# Patient Record
Sex: MALE | Race: WHITE | URBAN - NONMETROPOLITAN AREA
[De-identification: names, ages, dates, MRNs, and addresses within clinical notes are randomized per-mention and may not be internally consistent; named-entity substitution may affect disease eponyms.]

---

## 2020-06-16 ENCOUNTER — OFFICE VISIT (OUTPATIENT)
Dept: URBAN - NONMETROPOLITAN AREA CLINIC 2 | Facility: CLINIC | Age: 20
End: 2020-06-16

## 2020-07-01 ENCOUNTER — TELEPHONE ENCOUNTER (OUTPATIENT)
Dept: URBAN - NONMETROPOLITAN AREA CLINIC 2 | Facility: CLINIC | Age: 20
End: 2020-07-01

## 2020-07-08 ENCOUNTER — TELEPHONE ENCOUNTER (OUTPATIENT)
Dept: URBAN - METROPOLITAN AREA CLINIC 92 | Facility: CLINIC | Age: 20
End: 2020-07-08

## 2020-07-13 ENCOUNTER — TELEPHONE ENCOUNTER (OUTPATIENT)
Dept: URBAN - METROPOLITAN AREA CLINIC 92 | Facility: CLINIC | Age: 20
End: 2020-07-13

## 2020-07-20 ENCOUNTER — OFFICE VISIT (OUTPATIENT)
Dept: URBAN - METROPOLITAN AREA TELEHEALTH 2 | Facility: TELEHEALTH | Age: 20
End: 2020-07-20

## 2020-07-20 DIAGNOSIS — K20.0 EOSINOPHILIC ESOPHAGITIS: ICD-10-CM

## 2020-07-20 DIAGNOSIS — K31.84 GASTROPARESIS: ICD-10-CM

## 2020-07-20 DIAGNOSIS — K50.80 CROHN'S DISEASE OF BOTH SMALL AND LARGE INTESTINE: ICD-10-CM

## 2020-07-20 DIAGNOSIS — R10.84 ABDOMINAL PAIN, GENERALIZED: ICD-10-CM

## 2020-07-20 PROCEDURE — 99213 OFFICE O/P EST LOW 20 MIN: CPT | Performed by: NURSE PRACTITIONER

## 2020-07-20 PROCEDURE — G8427 DOCREV CUR MEDS BY ELIG CLIN: HCPCS | Performed by: NURSE PRACTITIONER

## 2020-07-20 RX ORDER — PANTOPRAZOLE SODIUM 40 MG/1
TAKE 1 TABLET (40 MG) BY ORAL ROUTE 2 TIMES PER DAY FOR 90 DAYS TABLET, DELAYED RELEASE ORAL 2
Qty: 180 | Refills: 3 | Status: ACTIVE | COMMUNITY
Start: 2020-02-28 | End: 2021-02-22

## 2020-07-20 RX ORDER — INFLIXIMAB 100 MG/1
10MG/KG EVERY 8 WEEKS. 25MG IV BENADRYL AND 500MG TYLENOL PO BEFORE EACH INFUSION INJECTION, POWDER, LYOPHILIZED, FOR SOLUTION INTRAVENOUS
OUTPATIENT
Start: 2020-07-20

## 2020-07-20 RX ORDER — ONDANSETRON 4 MG/1
DISSOLVE  1 TABLET BY ORAL ROUTE QID PRN NAUSEA TABLET, ORALLY DISINTEGRATING ORAL
Qty: 64 | Refills: 2 | Status: ACTIVE | COMMUNITY
Start: 2020-02-28 | End: 1900-01-01

## 2020-07-20 RX ORDER — DICYCLOMINE HYDROCHLORIDE 10 MG/1
1 CAPSULES CAPSULE ORAL THREE TIMES A DAY
Qty: 270 CAPSULE | Refills: 3 | OUTPATIENT
Start: 2020-07-20 | End: 2021-07-15

## 2020-07-20 RX ORDER — INFLIXIMAB 100 MG/10ML
INJECTION, POWDER, LYOPHILIZED, FOR SOLUTION INTRAVENOUS
Qty: 0 | Refills: 0 | Status: ACTIVE | COMMUNITY
Start: 1900-01-01 | End: 1900-01-01

## 2020-07-20 RX ORDER — METOCLOPRAMIDE HYDROCHLORIDE 5 MG/1
TAKE 1 TABLET BY ORAL ROUTE 3 TIMES A DAY FOR 30 DAYS TABLET ORAL
Qty: 90 | Refills: 6 | Status: ACTIVE | COMMUNITY
Start: 2020-02-28 | End: 2020-09-25

## 2020-07-20 NOTE — HPI-TODAY'S VISIT:
Santiago returns for f/u of crohns, GP, and EoE via teleheatlh. He is here today to discuss medication options. He had been getting renflexis at [facility] and doing well. However, last week, his renflexis infusion was switched to  home infusion. He developed some rash and SOB about 30 minutes into the infusion and was sent to [facility] ED. He reports it has done the best to control his symptoms. He is having 1 nonbloody BM daily. No loose stools. He has some mild abdominal cramping. no additional complaints. SB

## 2020-07-22 ENCOUNTER — TELEPHONE ENCOUNTER (OUTPATIENT)
Dept: URBAN - METROPOLITAN AREA CLINIC 92 | Facility: CLINIC | Age: 20
End: 2020-07-22

## 2020-08-14 ENCOUNTER — TELEPHONE ENCOUNTER (OUTPATIENT)
Dept: URBAN - METROPOLITAN AREA CLINIC 92 | Facility: CLINIC | Age: 20
End: 2020-08-14

## 2020-08-14 RX ORDER — VEDOLIZUMAB 300 MG/5ML
AS DIRECTED INJECTION, POWDER, LYOPHILIZED, FOR SOLUTION INTRAVENOUS
Qty: 1 | Refills: 0 | OUTPATIENT
Start: 2020-08-14 | End: 2020-08-15

## 2020-08-21 ENCOUNTER — LAB OUTSIDE AN ENCOUNTER (OUTPATIENT)
Dept: URBAN - METROPOLITAN AREA CLINIC 92 | Facility: CLINIC | Age: 20
End: 2020-08-21

## 2020-08-26 ENCOUNTER — OFFICE VISIT (OUTPATIENT)
Dept: URBAN - NONMETROPOLITAN AREA CLINIC 1 | Facility: CLINIC | Age: 20
End: 2020-08-26

## 2020-08-26 DIAGNOSIS — K50.80 CROHN'S COLITIS: ICD-10-CM

## 2020-08-26 PROCEDURE — 96413 CHEMO IV INFUSION 1 HR: CPT | Performed by: INTERNAL MEDICINE

## 2020-08-26 RX ORDER — METOCLOPRAMIDE HYDROCHLORIDE 5 MG/1
TAKE 1 TABLET BY ORAL ROUTE 3 TIMES A DAY FOR 30 DAYS TABLET ORAL
Qty: 90 | Refills: 6 | Status: ACTIVE | COMMUNITY
Start: 2020-02-28 | End: 2020-09-25

## 2020-08-26 RX ORDER — PANTOPRAZOLE SODIUM 40 MG/1
TAKE 1 TABLET (40 MG) BY ORAL ROUTE 2 TIMES PER DAY FOR 90 DAYS TABLET, DELAYED RELEASE ORAL 2
Qty: 180 | Refills: 3 | Status: ACTIVE | COMMUNITY
Start: 2020-02-28 | End: 2021-02-22

## 2020-08-26 RX ORDER — INFLIXIMAB 100 MG/10ML
INJECTION, POWDER, LYOPHILIZED, FOR SOLUTION INTRAVENOUS
Qty: 0 | Refills: 0 | Status: ACTIVE | COMMUNITY
Start: 1900-01-01 | End: 1900-01-01

## 2020-08-26 RX ORDER — ONDANSETRON 4 MG/1
DISSOLVE  1 TABLET BY ORAL ROUTE QID PRN NAUSEA TABLET, ORALLY DISINTEGRATING ORAL
Qty: 64 | Refills: 2 | Status: ACTIVE | COMMUNITY
Start: 2020-02-28 | End: 1900-01-01

## 2020-08-26 RX ORDER — DICYCLOMINE HYDROCHLORIDE 10 MG/1
1 CAPSULES CAPSULE ORAL THREE TIMES A DAY
Qty: 270 CAPSULE | Refills: 3 | Status: ACTIVE | COMMUNITY
Start: 2020-07-20 | End: 2021-07-15

## 2020-08-26 RX ORDER — INFLIXIMAB 100 MG/1
10MG/KG EVERY 8 WEEKS. 25MG IV BENADRYL AND 500MG TYLENOL PO BEFORE EACH INFUSION INJECTION, POWDER, LYOPHILIZED, FOR SOLUTION INTRAVENOUS
Status: ACTIVE | COMMUNITY
Start: 2020-07-20

## 2020-09-09 ENCOUNTER — OFFICE VISIT (OUTPATIENT)
Dept: URBAN - NONMETROPOLITAN AREA CLINIC 1 | Facility: CLINIC | Age: 20
End: 2020-09-09

## 2020-09-09 DIAGNOSIS — K50.80 CROHN'S COLITIS: ICD-10-CM

## 2020-09-09 PROCEDURE — 96413 CHEMO IV INFUSION 1 HR: CPT | Performed by: INTERNAL MEDICINE

## 2020-09-09 RX ORDER — DICYCLOMINE HYDROCHLORIDE 10 MG/1
1 CAPSULES CAPSULE ORAL THREE TIMES A DAY
Qty: 270 CAPSULE | Refills: 3 | Status: ACTIVE | COMMUNITY
Start: 2020-07-20 | End: 2021-07-15

## 2020-09-09 RX ORDER — METOCLOPRAMIDE HYDROCHLORIDE 5 MG/1
TAKE 1 TABLET BY ORAL ROUTE 3 TIMES A DAY FOR 30 DAYS TABLET ORAL
Qty: 90 | Refills: 6 | Status: ACTIVE | COMMUNITY
Start: 2020-02-28 | End: 2020-09-25

## 2020-09-09 RX ORDER — ONDANSETRON 4 MG/1
DISSOLVE  1 TABLET BY ORAL ROUTE QID PRN NAUSEA TABLET, ORALLY DISINTEGRATING ORAL
Qty: 64 | Refills: 2 | Status: ACTIVE | COMMUNITY
Start: 2020-02-28 | End: 1900-01-01

## 2020-09-09 RX ORDER — INFLIXIMAB 100 MG/1
10MG/KG EVERY 8 WEEKS. 25MG IV BENADRYL AND 500MG TYLENOL PO BEFORE EACH INFUSION INJECTION, POWDER, LYOPHILIZED, FOR SOLUTION INTRAVENOUS
Status: ACTIVE | COMMUNITY
Start: 2020-07-20

## 2020-09-09 RX ORDER — PANTOPRAZOLE SODIUM 40 MG/1
TAKE 1 TABLET (40 MG) BY ORAL ROUTE 2 TIMES PER DAY FOR 90 DAYS TABLET, DELAYED RELEASE ORAL 2
Qty: 180 | Refills: 3 | Status: ACTIVE | COMMUNITY
Start: 2020-02-28 | End: 2021-02-22

## 2020-09-09 RX ORDER — INFLIXIMAB 100 MG/10ML
INJECTION, POWDER, LYOPHILIZED, FOR SOLUTION INTRAVENOUS
Qty: 0 | Refills: 0 | Status: ACTIVE | COMMUNITY
Start: 1900-01-01 | End: 1900-01-01

## 2020-10-07 ENCOUNTER — OFFICE VISIT (OUTPATIENT)
Dept: URBAN - NONMETROPOLITAN AREA CLINIC 1 | Facility: CLINIC | Age: 20
End: 2020-10-07

## 2020-11-04 ENCOUNTER — OFFICE VISIT (OUTPATIENT)
Dept: URBAN - NONMETROPOLITAN AREA CLINIC 1 | Facility: CLINIC | Age: 20
End: 2020-11-04

## 2020-11-04 DIAGNOSIS — K50.80 CROHN'S COLITIS: ICD-10-CM

## 2020-11-04 PROCEDURE — 96413 CHEMO IV INFUSION 1 HR: CPT | Performed by: INTERNAL MEDICINE

## 2020-11-04 RX ORDER — ONDANSETRON 4 MG/1
DISSOLVE  1 TABLET BY ORAL ROUTE QID PRN NAUSEA TABLET, ORALLY DISINTEGRATING ORAL
Qty: 64 | Refills: 2 | Status: ACTIVE | COMMUNITY
Start: 2020-02-28 | End: 1900-01-01

## 2020-11-04 RX ORDER — INFLIXIMAB 100 MG/1
10MG/KG EVERY 8 WEEKS. 25MG IV BENADRYL AND 500MG TYLENOL PO BEFORE EACH INFUSION INJECTION, POWDER, LYOPHILIZED, FOR SOLUTION INTRAVENOUS
Status: ACTIVE | COMMUNITY
Start: 2020-07-20

## 2020-11-04 RX ORDER — DICYCLOMINE HYDROCHLORIDE 10 MG/1
1 CAPSULES CAPSULE ORAL THREE TIMES A DAY
Qty: 270 CAPSULE | Refills: 3 | Status: ACTIVE | COMMUNITY
Start: 2020-07-20 | End: 2021-07-15

## 2020-11-04 RX ORDER — PANTOPRAZOLE SODIUM 40 MG/1
TAKE 1 TABLET (40 MG) BY ORAL ROUTE 2 TIMES PER DAY FOR 90 DAYS TABLET, DELAYED RELEASE ORAL 2
Qty: 180 | Refills: 3 | Status: ACTIVE | COMMUNITY
Start: 2020-02-28 | End: 2021-02-22

## 2020-11-04 RX ORDER — INFLIXIMAB 100 MG/10ML
INJECTION, POWDER, LYOPHILIZED, FOR SOLUTION INTRAVENOUS
Qty: 0 | Refills: 0 | Status: ACTIVE | COMMUNITY
Start: 1900-01-01 | End: 1900-01-01

## 2020-11-05 ENCOUNTER — TELEPHONE ENCOUNTER (OUTPATIENT)
Dept: URBAN - METROPOLITAN AREA CLINIC 92 | Facility: CLINIC | Age: 20
End: 2020-11-05

## 2020-11-10 ENCOUNTER — TELEPHONE ENCOUNTER (OUTPATIENT)
Dept: URBAN - METROPOLITAN AREA CLINIC 92 | Facility: CLINIC | Age: 20
End: 2020-11-10

## 2020-11-20 LAB
A/G RATIO: 2.2
ALBUMIN: 4.7
ALKALINE PHOSPHATASE: 48
ALT (SGPT): 13
ANTI-VEDOLIZUMAB ANTIBODY: <25
AST (SGOT): 12
BASO (ABSOLUTE): 0.1
BASOS: 1
BILIRUBIN, TOTAL: 0.8
BUN/CREATININE RATIO: 13
BUN: 15
C-REACTIVE PROTEIN, QUANT: <1
CALCIUM: 9.9
CARBON DIOXIDE, TOTAL: 26
CHLORIDE: 102
CREATININE: 1.16
EGFR IF AFRICN AM: 104
EGFR IF NONAFRICN AM: 90
EOS (ABSOLUTE): 0.2
EOS: 2
GLOBULIN, TOTAL: 2.1
GLUCOSE: 96
HEMATOCRIT: 42.8
HEMATOLOGY COMMENTS:: (no result)
HEMOGLOBIN: 15
IMMATURE CELLS: (no result)
IMMATURE GRANS (ABS): 0
IMMATURE GRANULOCYTES: 0
INR: 1
LYMPHS (ABSOLUTE): 2.7
LYMPHS: 36
MCH: 31.6
MCHC: 35
MCV: 90
MONOCYTES(ABSOLUTE): 0.8
MONOCYTES: 10
NEUTROPHILS (ABSOLUTE): 4
NEUTROPHILS: 51
NRBC: (no result)
PLATELETS: 252
POTASSIUM: 4.1
PROTEIN, TOTAL: 6.8
PROTHROMBIN TIME: 10.9
RBC: 4.75
RDW: 11.5
SARS-COV-2 ANTIBODIES: NEGATIVE
SEDIMENTATION RATE-WESTERGREN: 2
SODIUM: 141
VEDOLIZUMAB: 69
WBC: 7.7

## 2020-12-18 ENCOUNTER — OFFICE VISIT (OUTPATIENT)
Dept: URBAN - METROPOLITAN AREA TELEHEALTH 2 | Facility: TELEHEALTH | Age: 20
End: 2020-12-18

## 2020-12-18 DIAGNOSIS — Z09 FOLLOW UP: ICD-10-CM

## 2020-12-18 DIAGNOSIS — D89.9 IMMUNOSUPPRESSED STATUS: ICD-10-CM

## 2020-12-18 DIAGNOSIS — K20.0 EOSINOPHILIC ESOPHAGITIS: ICD-10-CM

## 2020-12-18 DIAGNOSIS — K51.90 ULCERATIVE COLITIS: ICD-10-CM

## 2020-12-18 DIAGNOSIS — K31.84 GASTROPARESIS: ICD-10-CM

## 2020-12-18 DIAGNOSIS — Z12.11 COLON CANCER SCREENING: ICD-10-CM

## 2020-12-18 DIAGNOSIS — K50.90 CROHNS DISEASE: ICD-10-CM

## 2020-12-18 PROCEDURE — 1036F TOBACCO NON-USER: CPT | Performed by: INTERNAL MEDICINE

## 2020-12-18 PROCEDURE — G9622 NO UNHEAL ETOH USER: HCPCS | Performed by: INTERNAL MEDICINE

## 2020-12-18 PROCEDURE — 3017F COLORECTAL CA SCREEN DOC REV: CPT | Performed by: INTERNAL MEDICINE

## 2020-12-18 PROCEDURE — G8417 CALC BMI ABV UP PARAM F/U: HCPCS | Performed by: INTERNAL MEDICINE

## 2020-12-18 PROCEDURE — G8418 CALC BMI BLW LOW PARAM F/U: HCPCS | Performed by: INTERNAL MEDICINE

## 2020-12-18 PROCEDURE — G8427 DOCREV CUR MEDS BY ELIG CLIN: HCPCS | Performed by: INTERNAL MEDICINE

## 2020-12-18 PROCEDURE — G8420 CALC BMI NORM PARAMETERS: HCPCS | Performed by: INTERNAL MEDICINE

## 2020-12-18 PROCEDURE — 996 AG2 (NON BILLABLE): Performed by: INTERNAL MEDICINE

## 2020-12-18 PROCEDURE — G9902 PT SCRN TBCO AND ID AS USER: HCPCS | Performed by: INTERNAL MEDICINE

## 2020-12-18 PROCEDURE — G8482 FLU IMMUNIZE ORDER/ADMIN: HCPCS | Performed by: INTERNAL MEDICINE

## 2020-12-18 PROCEDURE — G9903 PT SCRN TBCO ID AS NON USER: HCPCS | Performed by: INTERNAL MEDICINE

## 2020-12-18 RX ORDER — INFLIXIMAB 100 MG/10ML
INJECTION, POWDER, LYOPHILIZED, FOR SOLUTION INTRAVENOUS
Qty: 0 | Refills: 0 | Status: ACTIVE | COMMUNITY
Start: 1900-01-01 | End: 1900-01-01

## 2020-12-18 RX ORDER — PANTOPRAZOLE SODIUM 40 MG/1
TAKE 1 TABLET (40 MG) BY ORAL ROUTE 2 TIMES PER DAY FOR 90 DAYS TABLET, DELAYED RELEASE ORAL 2
Qty: 180 | Refills: 3 | Status: ACTIVE | COMMUNITY
Start: 2020-02-28 | End: 2021-02-22

## 2020-12-18 RX ORDER — INFLIXIMAB 100 MG/1
10MG/KG EVERY 8 WEEKS. 25MG IV BENADRYL AND 500MG TYLENOL PO BEFORE EACH INFUSION INJECTION, POWDER, LYOPHILIZED, FOR SOLUTION INTRAVENOUS
Status: ACTIVE | COMMUNITY
Start: 2020-07-20

## 2020-12-18 RX ORDER — ONDANSETRON 4 MG/1
DISSOLVE  1 TABLET BY ORAL ROUTE QID PRN NAUSEA TABLET, ORALLY DISINTEGRATING ORAL
Qty: 64 | Refills: 2 | Status: ACTIVE | COMMUNITY
Start: 2020-02-28 | End: 1900-01-01

## 2020-12-18 RX ORDER — DICYCLOMINE HYDROCHLORIDE 10 MG/1
1 CAPSULES CAPSULE ORAL THREE TIMES A DAY
Qty: 270 CAPSULE | Refills: 3 | Status: ACTIVE | COMMUNITY
Start: 2020-07-20 | End: 2021-07-15

## 2020-12-30 ENCOUNTER — OFFICE VISIT (OUTPATIENT)
Dept: URBAN - NONMETROPOLITAN AREA CLINIC 1 | Facility: CLINIC | Age: 20
End: 2020-12-30

## 2021-01-20 ENCOUNTER — TELEPHONE ENCOUNTER (OUTPATIENT)
Dept: URBAN - METROPOLITAN AREA CLINIC 92 | Facility: CLINIC | Age: 21
End: 2021-01-20

## 2021-01-20 ENCOUNTER — LAB OUTSIDE AN ENCOUNTER (OUTPATIENT)
Dept: URBAN - METROPOLITAN AREA TELEHEALTH 2 | Facility: TELEHEALTH | Age: 21
End: 2021-01-20

## 2021-01-20 ENCOUNTER — OFFICE VISIT (OUTPATIENT)
Dept: URBAN - METROPOLITAN AREA TELEHEALTH 2 | Facility: TELEHEALTH | Age: 21
End: 2021-01-20

## 2021-01-20 DIAGNOSIS — K31.84 GASTROPARESIS: ICD-10-CM

## 2021-01-20 DIAGNOSIS — K20.0 EOSINOPHILIC ESOPHAGITIS: ICD-10-CM

## 2021-01-20 DIAGNOSIS — K59.00 CONSTIPATED: ICD-10-CM

## 2021-01-20 DIAGNOSIS — K59.09 CHRONIC CONSTIPATION: ICD-10-CM

## 2021-01-20 DIAGNOSIS — K51.80 CHRONIC PANCOLONIC ULCERATIVE COLITIS: ICD-10-CM

## 2021-01-20 DIAGNOSIS — Z91.89 COLON CANCER HIGH RISK: ICD-10-CM

## 2021-01-20 DIAGNOSIS — Z09 FOLLOW UP: ICD-10-CM

## 2021-01-20 DIAGNOSIS — R53.83 FATIGUE: ICD-10-CM

## 2021-01-20 DIAGNOSIS — K51.90 ULCERATIVE COLITIS: ICD-10-CM

## 2021-01-20 DIAGNOSIS — D89.9 IMMUNOSUPPRESSED STATUS: ICD-10-CM

## 2021-01-20 PROCEDURE — 3017F COLORECTAL CA SCREEN DOC REV: CPT | Performed by: INTERNAL MEDICINE

## 2021-01-20 PROCEDURE — G8482 FLU IMMUNIZE ORDER/ADMIN: HCPCS | Performed by: INTERNAL MEDICINE

## 2021-01-20 PROCEDURE — G9622 NO UNHEAL ETOH USER: HCPCS | Performed by: INTERNAL MEDICINE

## 2021-01-20 PROCEDURE — 1036F TOBACCO NON-USER: CPT | Performed by: INTERNAL MEDICINE

## 2021-01-20 PROCEDURE — 99215 OFFICE O/P EST HI 40 MIN: CPT | Performed by: INTERNAL MEDICINE

## 2021-01-20 PROCEDURE — G8420 CALC BMI NORM PARAMETERS: HCPCS | Performed by: INTERNAL MEDICINE

## 2021-01-20 PROCEDURE — G9903 PT SCRN TBCO ID AS NON USER: HCPCS | Performed by: INTERNAL MEDICINE

## 2021-01-20 PROCEDURE — G8427 DOCREV CUR MEDS BY ELIG CLIN: HCPCS | Performed by: INTERNAL MEDICINE

## 2021-01-20 RX ORDER — ADALIMUMAB 40MG/0.4ML
AS DIRECTED KIT SUBCUTANEOUS
Qty: 1 | Refills: 11 | OUTPATIENT
Start: 2021-01-20 | End: 2021-02-01

## 2021-01-20 RX ORDER — INFLIXIMAB 100 MG/1
10MG/KG EVERY 8 WEEKS. 25MG IV BENADRYL AND 500MG TYLENOL PO BEFORE EACH INFUSION INJECTION, POWDER, LYOPHILIZED, FOR SOLUTION INTRAVENOUS
Status: ACTIVE | COMMUNITY
Start: 2020-07-20

## 2021-01-20 RX ORDER — SODIUM PICOSULFATE, MAGNESIUM OXIDE, AND ANHYDROUS CITRIC ACID 10; 3.5; 12 MG/160ML; G/160ML; G/160ML
320ML LIQUID ORAL ONCE
Qty: 1 KIT | Refills: 1 | OUTPATIENT
Start: 2021-01-20 | End: 2021-01-22

## 2021-01-20 RX ORDER — ADALIMUMAB 80MG/0.8ML
160MG WEEK 0 AND THEN 80 MG WEEK 2 KIT SUBCUTANEOUS EVERY 2 WEEKS
Qty: 3 PENS | Refills: 0 | OUTPATIENT
Start: 2021-01-20

## 2021-01-20 RX ORDER — ONDANSETRON 4 MG/1
DISSOLVE  1 TABLET BY ORAL ROUTE QID PRN NAUSEA TABLET, ORALLY DISINTEGRATING ORAL
Qty: 64 | Refills: 2 | Status: ACTIVE | COMMUNITY
Start: 2020-02-28 | End: 1900-01-01

## 2021-01-20 RX ORDER — INFLIXIMAB 100 MG/10ML
INJECTION, POWDER, LYOPHILIZED, FOR SOLUTION INTRAVENOUS
Qty: 0 | Refills: 0 | Status: ACTIVE | COMMUNITY
Start: 1900-01-01 | End: 1900-01-01

## 2021-01-20 RX ORDER — PANTOPRAZOLE SODIUM 40 MG/1
TAKE 1 TABLET (40 MG) BY ORAL ROUTE 2 TIMES PER DAY FOR 90 DAYS TABLET, DELAYED RELEASE ORAL 2
Qty: 180 | Refills: 3 | Status: ACTIVE | COMMUNITY
Start: 2020-02-28 | End: 2021-02-22

## 2021-01-20 RX ORDER — DICYCLOMINE HYDROCHLORIDE 10 MG/1
1 CAPSULES CAPSULE ORAL THREE TIMES A DAY
Qty: 270 CAPSULE | Refills: 3 | Status: ACTIVE | COMMUNITY
Start: 2020-07-20 | End: 2021-07-15

## 2021-02-11 ENCOUNTER — OFFICE VISIT (OUTPATIENT)
Dept: URBAN - METROPOLITAN AREA SURGERY CENTER 18 | Facility: SURGERY CENTER | Age: 21
End: 2021-02-11

## 2021-02-11 DIAGNOSIS — K51.00 CHRONIC PANCOLONIC ULCERATIVE COLITIS: ICD-10-CM

## 2021-02-11 PROCEDURE — G8907 PT DOC NO EVENTS ON DISCHARG: HCPCS | Performed by: INTERNAL MEDICINE

## 2021-02-11 PROCEDURE — 45380 COLONOSCOPY AND BIOPSY: CPT | Performed by: INTERNAL MEDICINE

## 2021-02-11 RX ORDER — ONDANSETRON 4 MG/1
DISSOLVE  1 TABLET BY ORAL ROUTE QID PRN NAUSEA TABLET, ORALLY DISINTEGRATING ORAL
Qty: 64 | Refills: 2 | Status: ACTIVE | COMMUNITY
Start: 2020-02-28 | End: 1900-01-01

## 2021-02-11 RX ORDER — DICYCLOMINE HYDROCHLORIDE 10 MG/1
1 CAPSULES CAPSULE ORAL THREE TIMES A DAY
Qty: 270 CAPSULE | Refills: 3 | Status: ACTIVE | COMMUNITY
Start: 2020-07-20 | End: 2021-07-15

## 2021-02-11 RX ORDER — INFLIXIMAB 100 MG/10ML
INJECTION, POWDER, LYOPHILIZED, FOR SOLUTION INTRAVENOUS
Qty: 0 | Refills: 0 | Status: ACTIVE | COMMUNITY
Start: 1900-01-01 | End: 1900-01-01

## 2021-02-11 RX ORDER — ADALIMUMAB 80MG/0.8ML
160MG WEEK 0 AND THEN 80 MG WEEK 2 KIT SUBCUTANEOUS EVERY 2 WEEKS
Qty: 3 PENS | Refills: 0 | Status: ACTIVE | COMMUNITY
Start: 2021-01-20

## 2021-02-11 RX ORDER — INFLIXIMAB 100 MG/1
10MG/KG EVERY 8 WEEKS. 25MG IV BENADRYL AND 500MG TYLENOL PO BEFORE EACH INFUSION INJECTION, POWDER, LYOPHILIZED, FOR SOLUTION INTRAVENOUS
Status: ACTIVE | COMMUNITY
Start: 2020-07-20

## 2021-02-11 RX ORDER — PANTOPRAZOLE SODIUM 40 MG/1
TAKE 1 TABLET (40 MG) BY ORAL ROUTE 2 TIMES PER DAY FOR 90 DAYS TABLET, DELAYED RELEASE ORAL 2
Qty: 180 | Refills: 3 | Status: ACTIVE | COMMUNITY
Start: 2020-02-28 | End: 2021-02-22

## 2021-02-26 ENCOUNTER — OFFICE VISIT (OUTPATIENT)
Dept: URBAN - METROPOLITAN AREA TELEHEALTH 2 | Facility: TELEHEALTH | Age: 21
End: 2021-02-26

## 2021-02-26 DIAGNOSIS — K51.80 CHRONIC PANCOLONIC ULCERATIVE COLITIS: ICD-10-CM

## 2021-02-26 DIAGNOSIS — K31.84 GASTROPARESIS: ICD-10-CM

## 2021-02-26 DIAGNOSIS — D89.9 IMMUNOSUPPRESSED STATUS: ICD-10-CM

## 2021-02-26 DIAGNOSIS — K20.0 EOSINOPHILIC ESOPHAGITIS: ICD-10-CM

## 2021-02-26 PROBLEM — 14760008 CONSTIPATED: Status: ACTIVE | Noted: 2021-01-20

## 2021-02-26 PROBLEM — 235675006 GASTROPARESIS: Status: ACTIVE | Noted: 2020-07-20

## 2021-02-26 PROCEDURE — 99215 OFFICE O/P EST HI 40 MIN: CPT | Performed by: INTERNAL MEDICINE

## 2021-02-26 RX ORDER — ADALIMUMAB 40MG/0.4ML
0.4 ML KIT SUBCUTANEOUS
Qty: 1 | Refills: 11 | OUTPATIENT
Start: 2021-02-26 | End: 2022-01-28

## 2021-02-26 RX ORDER — INFLIXIMAB 100 MG/10ML
INJECTION, POWDER, LYOPHILIZED, FOR SOLUTION INTRAVENOUS
Qty: 0 | Refills: 0 | Status: DISCONTINUED | COMMUNITY
Start: 1900-01-01

## 2021-02-26 RX ORDER — ADALIMUMAB 80MG/0.8ML
160MG WEEK 0 AND THEN 80 MG WEEK 2 KIT SUBCUTANEOUS EVERY 2 WEEKS
Qty: 3 PENS | Refills: 0 | Status: ACTIVE | COMMUNITY
Start: 2021-01-20

## 2021-02-26 RX ORDER — ADALIMUMAB 80MG/0.8ML
160MG WEEK 0 AND THEN 80 MG WEEK 2 KIT SUBCUTANEOUS EVERY 2 WEEKS
Qty: 3 PENS | Refills: 0 | OUTPATIENT

## 2021-02-26 RX ORDER — DICYCLOMINE HYDROCHLORIDE 10 MG/1
1 CAPSULES CAPSULE ORAL THREE TIMES A DAY
Qty: 270 CAPSULE | Refills: 3 | Status: ACTIVE | COMMUNITY
Start: 2020-07-20 | End: 2021-07-15

## 2021-02-26 RX ORDER — ONDANSETRON 4 MG/1
DISSOLVE  1 TABLET BY ORAL ROUTE QID PRN NAUSEA TABLET, ORALLY DISINTEGRATING ORAL
Qty: 64 | Refills: 2 | Status: ACTIVE | COMMUNITY
Start: 2020-02-28

## 2021-02-26 RX ORDER — INFLIXIMAB 100 MG/1
10MG/KG EVERY 8 WEEKS. 25MG IV BENADRYL AND 500MG TYLENOL PO BEFORE EACH INFUSION INJECTION, POWDER, LYOPHILIZED, FOR SOLUTION INTRAVENOUS
Status: DISCONTINUED | COMMUNITY
Start: 2020-07-20

## 2021-02-26 RX ORDER — SULFASALAZINE 500 MG/1
6 TABLET TABLET ORAL ONCE A DAY
Qty: 180 TABLET | Refills: 11 | OUTPATIENT
Start: 2021-02-26 | End: 2022-02-21

## 2021-02-26 NOTE — HPI-OTHER HISTORIES
Pt Tawnya is a 21 y/o indiv with colonic UC, currently on entyvio (stopped 10/2020), here for evaluation of refractory dz. . Pt is referred by Dr. Sabrina Hurley. . Pt was dxd with UC initially, was started on Lialda, which did not work.  Eventually, he was diagnosed with CD based on location, and was started on Humira.  Did not work well for him.  He then took Remicade, to which he had a reaction.  He then started entyvio.  Stopped it after experiencing some SOB and chest tightness.  His last dose was about 10/2020.   . Previously on 1/19/2021, pt reports that he currently he is constipation.  No blood in the stool.  Occasional abdominal pain, very mild, nothing makes it better or worse. . Today on 2/26/2021, pt reports that he has occasional bouts of abdominal pain.  However, he has bad arthritis. . PMH: arthritis (back, ankle, knee); UC; gastroparesis, EOE, meduallary kidney disease . 2/2021: The terminal ileum appeared normal. Biopsies were taken with a cold forceps for histology. The pathology specimen was placed into Bottle Number 1. An area of mildly erythematous mucosa was found in the ascending colon and in the cecum. This was biopsied with a cold forceps for histology. The pathology specimen was placed into Bottle Number 2. An area of erythematous mucosa was found in the transverse colon. This was biopsied with a cold forceps for histology. The pathology specimen was placed into Bottle Number 3. An area of erythematous mucosa was found in the descending colon. This was biopsied with a cold forceps for histology. The pathology specimen was placed into Bottle Number 4. An area of erythematous mucosa was found in the rectum, in the recto-sigmoid colon and in the sigmoid colon. This was biopsied with a cold forceps for histology. The pathology specimen was placed into Bottle Number 5. A Terminal ileum, biopsy Small bowel mucosa with no significant histopathology. No histologic evidence of active ileitis, granulomata or dysplasia. B Ascending colon, biopsy Minimal to mild active colitis, see comment. No histologic evidence of granulomata or dysplasia. C Transverse colon, biopsy Minimal active colitis, see comment. No histologic evidence of granulomata or dysplasia. D Descending colon, biopsy Minimal to mild active colitis, see comment. No histologic evidence of granulomata or dysplasia. E Rectosigmoid colon, biopsy Colonic mucosa with no significant histopathology. No histologic evidence of active colitis, granulomata or dysplasia. . This patient's clinical history of ulcerative colitis is noted. There is no evidence of dysplasia or granulomata identified. Clinical, endoscopic and pathologic correlation is required. . 1/2020: A. Esophagus , Biopsy: -Eosinophilic esophagitis. -Up to 20 eosinophils per high magnification field present in the squamous epithelium. -Alcian blue/PAS stain highlights a thickened squamous basal layer. -No fungal organisms or viral inclusions. -Negative for dysplasia and malignancy. . 2019: A.	Second	part	of	Duodenum	,	Biopsy: -Enteric	mucosa	with	no	diagnostic	abnormality. -Normal	villous	architecture;	no	evidence	of	Celiac	sprue. -Alcian	blue/PAS	stain	to	identify	Whipple	disease	is	negative;	no	parasites	are	seen. -Negative	for	dysplasia	and	malignancy. B.	Esophagus	,	Biopsy: -Eosinophilic	esophagitis. -15-20	eosinophils	per	high	magnification	field	focally	present	in	the	squamous	epithelium. -Alcian	blue/PAS	stain	highlights	a	thickened	squamous	basal	layer. -No	fungal	organisms	or	viral	inclusions. -Negative	for	dysplasia	and	malignancy. C.	Terminal	I leum	,	Biopsy: -Terminal	ileal	mucosa	with	granuloma	(see	comment). -No	dysplasia	or	malignancy. D.	Right	Colon	,	Biopsy: -Architectural	and	metaplastic	changes	consistent	with	history	of	inflammatory	bowel	disease. -No	evidence	of	active	colitis. -No	granulomas,	dysplasia,	or	malignancy.		 E.	Left	Colon	,	Biopsy: -Architectural	and	metaplastic	changes	consistent	with	history	of	inflammatory	bowel	disease. -No	evidence	of	active	colitis. -No	granulomas,	dysplasia,	or	malignancy. .

## 2021-03-04 ENCOUNTER — TELEPHONE ENCOUNTER (OUTPATIENT)
Dept: URBAN - METROPOLITAN AREA CLINIC 92 | Facility: CLINIC | Age: 21
End: 2021-03-04

## 2021-03-16 ENCOUNTER — TELEPHONE ENCOUNTER (OUTPATIENT)
Dept: URBAN - METROPOLITAN AREA CLINIC 92 | Facility: CLINIC | Age: 21
End: 2021-03-16

## 2021-03-16 RX ORDER — ADALIMUMAB 40MG/0.4ML
0.4 ML KIT SUBCUTANEOUS
Qty: 1 | Refills: 11
Start: 2021-02-26

## 2021-04-09 ENCOUNTER — TELEPHONE ENCOUNTER (OUTPATIENT)
Dept: URBAN - METROPOLITAN AREA CLINIC 92 | Facility: CLINIC | Age: 21
End: 2021-04-09

## 2021-07-01 ENCOUNTER — OFFICE VISIT (OUTPATIENT)
Dept: URBAN - METROPOLITAN AREA TELEHEALTH 2 | Facility: TELEHEALTH | Age: 21
End: 2021-07-01

## 2021-07-01 DIAGNOSIS — K51.90 ULCERATIVE COLITIS: ICD-10-CM

## 2021-07-01 DIAGNOSIS — K50.80 CROHN'S DISEASE OF BOTH SMALL AND LARGE INTESTINE WITHOUT COMPLICATION: ICD-10-CM

## 2021-07-01 PROBLEM — 71833008: Status: ACTIVE | Noted: 2021-07-01

## 2021-07-01 PROCEDURE — 99443 PHONE E/M BY PHYS 21-30 MIN: CPT | Performed by: INTERNAL MEDICINE

## 2021-07-01 RX ORDER — DICYCLOMINE HYDROCHLORIDE 10 MG/1
1 CAPSULES CAPSULE ORAL THREE TIMES A DAY
Qty: 270 CAPSULE | Refills: 3 | Status: ACTIVE | COMMUNITY
Start: 2020-07-20 | End: 2021-07-15

## 2021-07-01 RX ORDER — ONDANSETRON 4 MG/1
DISSOLVE  1 TABLET BY ORAL ROUTE QID PRN NAUSEA TABLET, ORALLY DISINTEGRATING ORAL
Qty: 64 | Refills: 2 | Status: ACTIVE | COMMUNITY
Start: 2020-02-28

## 2021-07-01 RX ORDER — ADALIMUMAB 80MG/0.8ML
160MG WEEK 0 AND THEN 80 MG WEEK 2 KIT SUBCUTANEOUS EVERY 2 WEEKS
Qty: 3 PENS | Refills: 0

## 2021-07-01 RX ORDER — SULFASALAZINE 500 MG/1
6 TABLET TABLET ORAL ONCE A DAY
Qty: 180 TABLET | Refills: 11 | Status: ACTIVE | COMMUNITY
Start: 2021-02-26 | End: 2022-02-21

## 2021-07-01 RX ORDER — ADALIMUMAB 40MG/0.4ML
1 SUBQ  Q 2 WEEKS KIT SUBCUTANEOUS EVERY 2 WEEKS
Qty: 6 PRE-FILLED SYRINGE | Refills: 0 | OUTPATIENT
Start: 2021-07-01 | End: 2021-09-29

## 2021-07-01 RX ORDER — ADALIMUMAB 80MG/0.8ML
160MG WEEK 0 AND THEN 80 MG WEEK 2 KIT SUBCUTANEOUS EVERY 2 WEEKS
Qty: 3 PENS | Refills: 0 | Status: ACTIVE | COMMUNITY

## 2021-07-01 RX ORDER — ADALIMUMAB 40MG/0.4ML
0.4 ML KIT SUBCUTANEOUS
Qty: 1 | Refills: 11 | Status: ACTIVE | COMMUNITY
Start: 2021-02-26

## 2021-07-01 NOTE — HPI-OTHER HISTORIES
Santiago returns via telephone call for Ileocolonic Crohn's and EoE.  Since his last clinic visit, he has been having more loose stools and wishes to start Humira as planned by Dr. Farmer.  He has been on Humira before but was bad at giving it to himself and was swapped off this drug.  He states that his mom will give it to him when he resumes it.

## 2021-08-03 ENCOUNTER — OFFICE VISIT (OUTPATIENT)
Dept: URBAN - METROPOLITAN AREA TELEHEALTH 2 | Facility: TELEHEALTH | Age: 21
End: 2021-08-03

## 2021-08-04 ENCOUNTER — OFFICE VISIT (OUTPATIENT)
Dept: URBAN - NONMETROPOLITAN AREA CLINIC 2 | Facility: CLINIC | Age: 21
End: 2021-08-04

## 2021-12-20 ENCOUNTER — TELEPHONE ENCOUNTER (OUTPATIENT)
Dept: URBAN - METROPOLITAN AREA CLINIC 82 | Facility: CLINIC | Age: 21
End: 2021-12-20

## 2022-07-28 ENCOUNTER — OFFICE VISIT (OUTPATIENT)
Dept: URBAN - NONMETROPOLITAN AREA CLINIC 2 | Facility: CLINIC | Age: 22
End: 2022-07-28

## 2022-10-12 ENCOUNTER — OFFICE VISIT (OUTPATIENT)
Dept: URBAN - NONMETROPOLITAN AREA CLINIC 2 | Facility: CLINIC | Age: 22
End: 2022-10-12

## 2022-10-12 RX ORDER — ADALIMUMAB 80MG/0.8ML
160MG WEEK 0 AND THEN 80 MG WEEK 2 KIT SUBCUTANEOUS EVERY 2 WEEKS
Qty: 3 PENS | Refills: 0 | Status: ACTIVE | COMMUNITY

## 2022-10-12 RX ORDER — ADALIMUMAB 40MG/0.4ML
0.4 ML KIT SUBCUTANEOUS
Qty: 1 | Refills: 11 | Status: ACTIVE | COMMUNITY
Start: 2021-02-26

## 2022-10-12 RX ORDER — ONDANSETRON 4 MG/1
DISSOLVE  1 TABLET BY ORAL ROUTE QID PRN NAUSEA TABLET, ORALLY DISINTEGRATING ORAL
Qty: 64 | Refills: 2 | Status: ACTIVE | COMMUNITY
Start: 2020-02-28

## 2023-12-05 ENCOUNTER — LAB OUTSIDE AN ENCOUNTER (OUTPATIENT)
Dept: URBAN - NONMETROPOLITAN AREA CLINIC 2 | Facility: CLINIC | Age: 23
End: 2023-12-05

## 2023-12-05 ENCOUNTER — DASHBOARD ENCOUNTERS (OUTPATIENT)
Age: 23
End: 2023-12-05

## 2023-12-05 ENCOUNTER — OFFICE VISIT (OUTPATIENT)
Dept: URBAN - NONMETROPOLITAN AREA CLINIC 2 | Facility: CLINIC | Age: 23
End: 2023-12-05

## 2023-12-05 VITALS
DIASTOLIC BLOOD PRESSURE: 102 MMHG | TEMPERATURE: 97.9 F | HEIGHT: 67 IN | HEART RATE: 79 BPM | SYSTOLIC BLOOD PRESSURE: 153 MMHG | BODY MASS INDEX: 32.33 KG/M2 | WEIGHT: 206 LBS

## 2023-12-05 DIAGNOSIS — R13.19 ESOPHAGEAL DYSPHAGIA: ICD-10-CM

## 2023-12-05 DIAGNOSIS — K20.0 EOSINOPHILIC ESOPHAGITIS: ICD-10-CM

## 2023-12-05 DIAGNOSIS — K31.84 GASTROPARESIS: ICD-10-CM

## 2023-12-05 DIAGNOSIS — D84.9 IMMUNOSUPPRESSED STATUS: ICD-10-CM

## 2023-12-05 DIAGNOSIS — K50.90 CROHNS DISEASE: ICD-10-CM

## 2023-12-05 PROBLEM — 38013005: Status: ACTIVE | Noted: 2023-12-05

## 2023-12-05 PROCEDURE — 99214 OFFICE O/P EST MOD 30 MIN: CPT | Performed by: NURSE PRACTITIONER

## 2023-12-05 RX ORDER — ONDANSETRON 4 MG/1
DISSOLVE  1 TABLET BY ORAL ROUTE QID PRN NAUSEA TABLET, ORALLY DISINTEGRATING ORAL
Qty: 64 | Refills: 2 | Status: ACTIVE | COMMUNITY
Start: 2020-02-28

## 2023-12-05 RX ORDER — SODIUM, POTASSIUM,MAG SULFATES 17.5-3.13G
AS DIRECTED SOLUTION, RECONSTITUTED, ORAL ORAL
Qty: 1 | Refills: 0 | OUTPATIENT
Start: 2023-12-05 | End: 2023-12-07

## 2023-12-05 RX ORDER — ADALIMUMAB 40MG/0.4ML
0.4 ML KIT SUBCUTANEOUS
Qty: 1 | Refills: 11 | Status: ACTIVE | COMMUNITY
Start: 2021-02-26

## 2023-12-05 RX ORDER — PANTOPRAZOLE SODIUM 40 MG/1
1 TABLET TABLET, DELAYED RELEASE ORAL TWICE DAILY
Qty: 60 TABLET | Refills: 6 | OUTPATIENT
Start: 2023-12-05

## 2023-12-05 RX ORDER — ADALIMUMAB 80MG/0.8ML
160MG WEEK 0 AND THEN 80 MG WEEK 2 KIT SUBCUTANEOUS EVERY 2 WEEKS
Qty: 3 PENS | Refills: 0 | Status: ACTIVE | COMMUNITY

## 2023-12-05 NOTE — HPI-TODAY'S VISIT:
Santiago Bowling is a pleasant 23-year-old male who returns for follow-up of gastroparesis, ileocolonic Crohn's, and eosinophilic esophagitis.  He has been off of medications for the last 2 years.  He reports his bowels are fairly regular as well as minimal issues with dysphagia.  He does report a few abscesses of dysphagia.  He is not on any PPI.  He has previously failed mesalamine, Humira, and Entyvio.  In terms of Entyvio he did have a reaction.  Humira was complicated as he was fearful of giving himself injections.  Thankfully, he has done pretty well for the most part outside of some mild dysphagia.  Denies any urgency.  No nocturnal complaints or abdominal pain sb

## 2023-12-08 LAB
A/G RATIO: 1.9
ABSOLUTE BASOPHILS: 50
ABSOLUTE EOSINOPHILS: 375
ABSOLUTE LYMPHOCYTES: 1781
ABSOLUTE MONOCYTES: 577
ABSOLUTE NEUTROPHILS: 2817
ALBUMIN: 4.6
ALKALINE PHOSPHATASE: 54
ALT (SGPT): 20
AST (SGOT): 19
BASOPHILS: 0.9
BILIRUBIN, TOTAL: 0.9
BUN/CREATININE RATIO: (no result)
BUN: 15
C-REACTIVE PROTEIN, QUANT: 1.1
CALCIUM: 9.7
CARBON DIOXIDE, TOTAL: 28
CHLORIDE: 103
CREATININE: 0.98
EGFR: 111
EOSINOPHILS: 6.7
GLOBULIN, TOTAL: 2.4
GLUCOSE: 83
HBSAG SCREEN: (no result)
HEMATOCRIT: 45.7
HEMOGLOBIN: 15.9
HEP A AB, IGM: (no result)
HEP B CORE AB, IGM: (no result)
HEPATITIS C ANTIBODY: (no result)
LYMPHOCYTES: 31.8
MCH: 30.3
MCHC: 34.8
MCV: 87
MITOGEN-NIL: >10
MONOCYTES: 10.3
MPV: 11.4
NEUTROPHILS: 50.3
PLATELET COUNT: 261
POTASSIUM: 4.4
PROTEIN, TOTAL: 7
QUANTIFERON NIL VALUE: 0.04
QUANTIFERON TB1 AG VALUE: <0
QUANTIFERON TB2 AG VALUE: 0
QUANTIFERON-TB GOLD PLUS: NEGATIVE
RDW: 12
RED BLOOD CELL COUNT: 5.25
SED RATE BY MODIFIED: 2
SODIUM: 139
WHITE BLOOD CELL COUNT: 5.6

## 2023-12-11 ENCOUNTER — WEB ENCOUNTER (OUTPATIENT)
Dept: URBAN - NONMETROPOLITAN AREA CLINIC 2 | Facility: CLINIC | Age: 23
End: 2023-12-11

## 2023-12-12 ENCOUNTER — TELEPHONE ENCOUNTER (OUTPATIENT)
Dept: URBAN - NONMETROPOLITAN AREA CLINIC 2 | Facility: CLINIC | Age: 23
End: 2023-12-12

## 2023-12-12 RX ORDER — POLYETHYLENE GLYCOL 3350, SODIUM SULFATE ANHYDROUS, SODIUM BICARBONATE, SODIUM CHLORIDE, POTASSIUM CHLORIDE 236; 22.74; 6.74; 5.86; 2.97 G/4L; G/4L; G/4L; G/4L; G/4L
AS DIRECTED POWDER, FOR SOLUTION ORAL ONCE
Qty: 1 GALLON | Refills: 0 | OUTPATIENT
Start: 2023-12-12 | End: 2023-12-13

## 2023-12-20 ENCOUNTER — TELEPHONE ENCOUNTER (OUTPATIENT)
Dept: URBAN - NONMETROPOLITAN AREA CLINIC 2 | Facility: CLINIC | Age: 23
End: 2023-12-20

## 2023-12-20 RX ORDER — POLYETHYLENE GLYCOL 3350, SODIUM SULFATE ANHYDROUS, SODIUM BICARBONATE, SODIUM CHLORIDE, POTASSIUM CHLORIDE 236; 22.74; 6.74; 5.86; 2.97 G/4L; G/4L; G/4L; G/4L; G/4L
AS DIRECTED POWDER, FOR SOLUTION ORAL ONCE
Qty: 1 GALLON | Refills: 0 | OUTPATIENT
Start: 2023-12-20 | End: 2023-12-21

## 2024-02-20 ENCOUNTER — C/E W/ DIL (OUTPATIENT)
Dept: URBAN - NONMETROPOLITAN AREA SURGERY CENTER 1 | Facility: SURGERY CENTER | Age: 24
End: 2024-02-20

## 2024-04-04 ENCOUNTER — OV EP (OUTPATIENT)
Dept: URBAN - NONMETROPOLITAN AREA CLINIC 2 | Facility: CLINIC | Age: 24
End: 2024-04-04

## 2024-04-05 ENCOUNTER — OV EP (OUTPATIENT)
Dept: URBAN - NONMETROPOLITAN AREA CLINIC 2 | Facility: CLINIC | Age: 24
End: 2024-04-05

## 2024-12-11 ENCOUNTER — OFFICE VISIT (OUTPATIENT)
Dept: URBAN - NONMETROPOLITAN AREA CLINIC 2 | Facility: CLINIC | Age: 24
End: 2024-12-11
Payer: COMMERCIAL

## 2024-12-11 ENCOUNTER — LAB OUTSIDE AN ENCOUNTER (OUTPATIENT)
Dept: URBAN - NONMETROPOLITAN AREA CLINIC 2 | Facility: CLINIC | Age: 24
End: 2024-12-11

## 2024-12-11 DIAGNOSIS — R13.19 ESOPHAGEAL DYSPHAGIA: ICD-10-CM

## 2024-12-11 DIAGNOSIS — D84.9 IMMUNOSUPPRESSED STATUS: ICD-10-CM

## 2024-12-11 DIAGNOSIS — K20.0 EOSINOPHILIC ESOPHAGITIS: ICD-10-CM

## 2024-12-11 DIAGNOSIS — K31.84 GASTROPARESIS: ICD-10-CM

## 2024-12-11 DIAGNOSIS — K50.90 CROHNS DISEASE: ICD-10-CM

## 2024-12-11 PROCEDURE — 99214 OFFICE O/P EST MOD 30 MIN: CPT | Performed by: NURSE PRACTITIONER

## 2024-12-11 RX ORDER — PANTOPRAZOLE SODIUM 40 MG/1
1 TABLET TABLET, DELAYED RELEASE ORAL TWICE DAILY
Qty: 60 TABLET | Refills: 6 | Status: ACTIVE | COMMUNITY
Start: 2023-12-05

## 2024-12-11 RX ORDER — ONDANSETRON 4 MG/1
DISSOLVE  1 TABLET BY ORAL ROUTE QID PRN NAUSEA TABLET, ORALLY DISINTEGRATING ORAL
Qty: 64 | Refills: 2 | Status: ACTIVE | COMMUNITY
Start: 2020-02-28

## 2024-12-11 RX ORDER — ADALIMUMAB 80MG/0.8ML
160MG WEEK 0 AND THEN 80 MG WEEK 2 KIT SUBCUTANEOUS EVERY 2 WEEKS
Qty: 3 PENS | Refills: 0 | Status: ACTIVE | COMMUNITY

## 2024-12-11 RX ORDER — PANTOPRAZOLE SODIUM 40 MG/1
1 TABLET TABLET, DELAYED RELEASE ORAL ONCE A DAY
Qty: 90 TABLET | Refills: 3 | OUTPATIENT

## 2024-12-11 RX ORDER — ADALIMUMAB 40MG/0.4ML
0.4 ML KIT SUBCUTANEOUS
Qty: 1 | Refills: 11 | Status: ACTIVE | COMMUNITY
Start: 2021-02-26

## 2024-12-11 NOTE — PHYSICAL EXAM GASTROINTESTINAL
Abdomen , soft, nontender, nondistended, normal bowel sounds  , Abdomen , soft, nontender, nondistended, normal bowel sounds

## 2024-12-11 NOTE — HPI-TODAY'S VISIT:
Santiago Bowling is a pleasant 24-year-old male who returns for follow-up of gastroparesis, ileocolonic Crohn's, and eosinophilic esophagitis.  He has been off of medications for the last 2 years.  He reports his bowels are fairly regular as well as minimal issues with dysphagia.  He does report a few episodes of dysphagia.  takes prn on occasion. .  He has previously failed mesalamine, Humira, and Entyvio.  In terms of Entyvio he did have a reaction.  Humira was complicated as he was fearful of giving himself injections.  Thankfully, he has done pretty well for the most part outside of some mild dysphagia.  Denies any urgency.  No nocturnal complaints or abdominal pain  recently  with a baby boy. he brought them to clinic today to introduce me to the baby. sb

## 2024-12-13 LAB
IRON BIND.CAP.(TIBC): 338
IRON SATURATION: 31
IRON: 104
Lab: (no result)
REQUEST PROBLEM: (no result)
UIBC: 234
VITAMIN B12: (no result)
VITAMIN D, 25-HYDROXY: 31.4

## 2024-12-16 ENCOUNTER — TELEPHONE ENCOUNTER (OUTPATIENT)
Dept: URBAN - NONMETROPOLITAN AREA CLINIC 2 | Facility: CLINIC | Age: 24
End: 2024-12-16

## 2025-02-06 ENCOUNTER — OFFICE VISIT (OUTPATIENT)
Dept: URBAN - NONMETROPOLITAN AREA SURGERY CENTER 1 | Facility: SURGERY CENTER | Age: 25
End: 2025-02-06

## 2025-03-04 ENCOUNTER — OFFICE VISIT (OUTPATIENT)
Dept: URBAN - NONMETROPOLITAN AREA CLINIC 2 | Facility: CLINIC | Age: 25
End: 2025-03-04

## 2025-06-05 ENCOUNTER — TELEPHONE ENCOUNTER (OUTPATIENT)
Dept: URBAN - NONMETROPOLITAN AREA CLINIC 2 | Facility: CLINIC | Age: 25
End: 2025-06-05

## 2025-06-05 RX ORDER — PREDNISONE 10 MG/1
TAKE 4 TABS PO X 1 WEEK, TAKE 3 TABS PO X 1 WEEK, TAKE 2 TABS PO X 1 WEEK, TAKE 1 TAB PO X 1 WEEK TABLET ORAL ONCE A DAY
Qty: 70 | Refills: 0 | OUTPATIENT
Start: 2025-06-05

## 2025-06-09 NOTE — PHYSICAL EXAM NEUROLOGIC:
BMI 35.16  unchanged  Continue dietary and lifestyle modifications   oriented to person, place and time NO HA< no focal weakness.  No chest pain/pressure.   No palpitations.  No cough, no wheezing.  NO abdominal pain, no red blood per rectum or melena.  No back pain, no tearing back pain.    NO rash.  NO SI/HI>  NO thyroid symptoms.  NO joint pain.  NO weight loss.

## 2025-06-10 ENCOUNTER — TELEPHONE ENCOUNTER (OUTPATIENT)
Dept: URBAN - NONMETROPOLITAN AREA CLINIC 2 | Facility: CLINIC | Age: 25
End: 2025-06-10

## 2025-07-18 ENCOUNTER — TELEPHONE ENCOUNTER (OUTPATIENT)
Dept: URBAN - NONMETROPOLITAN AREA CLINIC 2 | Facility: CLINIC | Age: 25
End: 2025-07-18

## 2025-07-31 ENCOUNTER — OFFICE VISIT (OUTPATIENT)
Dept: URBAN - NONMETROPOLITAN AREA SURGERY CENTER 1 | Facility: SURGERY CENTER | Age: 25
End: 2025-07-31